# Patient Record
Sex: FEMALE | Race: WHITE | NOT HISPANIC OR LATINO | Employment: UNEMPLOYED | URBAN - METROPOLITAN AREA
[De-identification: names, ages, dates, MRNs, and addresses within clinical notes are randomized per-mention and may not be internally consistent; named-entity substitution may affect disease eponyms.]

---

## 2023-08-10 ENCOUNTER — APPOINTMENT (OUTPATIENT)
Dept: RADIOLOGY | Facility: IMAGING CENTER | Age: 33
End: 2023-08-10
Attending: PHYSICIAN ASSISTANT
Payer: COMMERCIAL

## 2023-08-10 ENCOUNTER — OFFICE VISIT (OUTPATIENT)
Dept: URGENT CARE | Facility: CLINIC | Age: 33
End: 2023-08-10
Payer: COMMERCIAL

## 2023-08-10 VITALS
SYSTOLIC BLOOD PRESSURE: 122 MMHG | RESPIRATION RATE: 16 BRPM | WEIGHT: 188.9 LBS | HEART RATE: 86 BPM | DIASTOLIC BLOOD PRESSURE: 84 MMHG | BODY MASS INDEX: 31.47 KG/M2 | TEMPERATURE: 96.8 F | HEIGHT: 65 IN | OXYGEN SATURATION: 97 %

## 2023-08-10 DIAGNOSIS — M25.521 RIGHT ELBOW PAIN: ICD-10-CM

## 2023-08-10 PROCEDURE — 3074F SYST BP LT 130 MM HG: CPT | Performed by: PHYSICIAN ASSISTANT

## 2023-08-10 PROCEDURE — 99203 OFFICE O/P NEW LOW 30 MIN: CPT | Performed by: PHYSICIAN ASSISTANT

## 2023-08-10 PROCEDURE — 73080 X-RAY EXAM OF ELBOW: CPT | Mod: TC,RT | Performed by: PHYSICIAN ASSISTANT

## 2023-08-10 PROCEDURE — 3079F DIAST BP 80-89 MM HG: CPT | Performed by: PHYSICIAN ASSISTANT

## 2023-08-10 ASSESSMENT — FIBROSIS 4 INDEX: FIB4 SCORE: 0.37

## 2023-08-10 NOTE — PROGRESS NOTES
"Tirso Howard is a 32 y.o. female who presents with Elbow Pain (R elbow, hurts to lift and  R elbow x 1 week )            HPI    This is a new problem.   The patient presents to clinic complaining of right elbow pain x 1 week.  The patient states approximately 1 week ago she was attacked by bees.  The patient states as she was flailing as she was running away from the bees.  The patient is unsure if she may have hit her right elbow while she was flailing.  The patient developed subsequent pain to her right elbow.  The patient notes slight swelling to the affected area.  She reports no associated bruising or redness.  The patient reports no radiation of pain.  She also reports no numbness, tingling, weakness.  The patient states she is not experiencing any decreased range of motion of the right elbow, but reports increased pain with movement and use.  The patient has not taken any OTC medications for her current symptoms.  She is right-handed.    PMH:  has no past medical history on file.  MEDS: No current outpatient medications on file.  ALLERGIES:   Allergies   Allergen Reactions    Dairy Food Allergy      Other reaction(s): Headache    Norgestimate-Ethinyl Estradiol      Other reaction(s): Other (See Comments)     SURGHX: No past surgical history on file.  SOCHX:  reports that she has never smoked. She has never used smokeless tobacco. She reports that she does not drink alcohol and does not use drugs.  FH: Family history was reviewed, no pertinent findings to report      Review of Systems   Musculoskeletal:  Positive for joint pain.   All other systems reviewed and are negative.             Objective     /84 (BP Location: Left arm, Patient Position: Sitting, BP Cuff Size: Adult)   Pulse 86   Temp 36 °C (96.8 °F) (Temporal)   Resp 16   Ht 1.651 m (5' 5\")   Wt 85.7 kg (188 lb 14.4 oz)   SpO2 97%   BMI 31.43 kg/m²      Physical Exam  Constitutional:       General: She is not in " acute distress.     Appearance: Normal appearance. She is well-developed. She is not ill-appearing.   HENT:      Head: Normocephalic and atraumatic.      Right Ear: External ear normal.      Left Ear: External ear normal.      Nose: Nose normal.   Eyes:      Extraocular Movements: Extraocular movements intact.      Conjunctiva/sclera: Conjunctivae normal.   Cardiovascular:      Rate and Rhythm: Normal rate.   Pulmonary:      Effort: Pulmonary effort is normal.   Musculoskeletal:      Cervical back: Normal range of motion and neck supple.      Comments:   Right Elbow:  A localized area of tenderness is present to the lateral aspect of the right elbow.  No additional tenderness of the right elbow.  No tenderness overlying the olecranon process.  No edema.  No ecchymosis.  No overlying erythema.  No increased warmth.  No open wounds/abrasions.  No obvious deformity.  ROM intact -the patient demonstrates full active range of motion of the right elbow  Neurovascular intact distally  Radial pulse 2+  Strength 5/5 -flexion/extension of the right elbow against resistance   Skin:     General: Skin is warm and dry.   Neurological:      Mental Status: She is alert and oriented to person, place, and time.            Progress:  Right Elbow XR:   COMPARISON: None     FINDINGS:  Bone mineralization is age appropriate. Bony alignment is anatomic. There is no evidence of acute fracture or dislocation.     IMPRESSION:  No radiographic evidence of acute traumatic injury.     Reviewed x-ray results with the patient in clinic.    Based on the patient's presenting symptoms and physical examination, I suspect the patient symptoms related to an acute strain and or possible tendinitis.    Will place referral to orthopedics for further evaluation and management if her symptoms do not improve with over-the-counter medications and supportive care.         Assessment & Plan          1. Right elbow pain  - DX-ELBOW-COMPLETE 3+ RIGHT; Future  -  Referral to Orthopedics    Differential diagnoses, supportive care, and indications for immediate follow-up discussed with patient.   Instructed to return to clinic or nearest emergency department for any change in condition, further concerns, or worsening of symptoms.    OTC NSAIDs for pain/discomfort   RICE  Referral to Orthopedics   Follow-up with PCP   Return to clinic or go tot he ED if symptoms worsen or fail to improve, or if the patient should develop worsening/increasing pain/tenderness, swelling, bruising, redness or warmth to the affected area, decreased ROM, numbness, tingling or weakness, difficulty walking, fever/chills, and/or any concerning symptoms.     Discussed plan with the patient, and she agrees to the above.     I personally reviewed prior external notes and test results pertinent to today's visit.  I have independently reviewed and interpreted all diagnostics ordered during this urgent care visit.     Please note that this dictation was created using voice recognition software. I have made every reasonable attempt to correct obvious errors, but I expect that there may be errors of grammar and possibly content that I did not discover before finalizing the note.     This note was electronically signed by Jacquelyn Diallo PA-C